# Patient Record
Sex: MALE | Race: BLACK OR AFRICAN AMERICAN | HISPANIC OR LATINO | URBAN - METROPOLITAN AREA
[De-identification: names, ages, dates, MRNs, and addresses within clinical notes are randomized per-mention and may not be internally consistent; named-entity substitution may affect disease eponyms.]

---

## 2018-09-20 VITALS
RESPIRATION RATE: 18 BRPM | SYSTOLIC BLOOD PRESSURE: 145 MMHG | DIASTOLIC BLOOD PRESSURE: 84 MMHG | HEART RATE: 55 BPM | TEMPERATURE: 97 F

## 2018-09-20 RX ORDER — CHLORHEXIDINE GLUCONATE 213 G/1000ML
1 SOLUTION TOPICAL ONCE
Qty: 0 | Refills: 0 | Status: DISCONTINUED | OUTPATIENT
Start: 2018-09-21 | End: 2018-09-22

## 2018-09-20 NOTE — H&P ADULT - HISTORY OF PRESENT ILLNESS
**WILL BRING IN ALL MEDICATIONS--PLEASE UPDATE**    53 yr old M with FHx of heart disease, PMHx of HTN, hyperlipidemia, CVA in 2008 (residual dysarthria, RLE weakness), BPH who initially presented to cardiologist c/o progressively worsening HIGGINS x few months. Patient states he cannot walk >2 blocks at slow pace without becoming fatigued and winded. Patient denies any chest pain, palpitations, dizziness, PND, orthopnea or LE edema. Echo 6/18/18 revealed normal LV systolic function, EF:60-65%. Mild MR, trace TR, trace AR.  Exercise Stress Test 6/25/18 showed 1 mm ST depressions in lead III and AVF. Patient further underwent Nuclear Stress Test on 8/14/18 which revealed medium inferior partially reversible defect, EF:53%.     In light of patients risk factors, CCS Angina Class III equivalent symptoms and abnormal stress test, patient now presents for recommended cardiac catheterization with possible intervention. 53 yr old M with FHx of heart disease, PMHx of HTN, hyperlipidemia, CVA in 2008 (residual dysarthria, RUE and RLE weakness, on plavix), BPH who initially presented to cardiologist c/o progressively worsening HIGGINS x few months. Patient states he cannot walk >2 blocks at slow pace without becoming fatigued and winded. Patient denies any chest pain, palpitations, dizziness, PND, orthopnea or LE edema. Echo 6/18/18 revealed normal LV systolic function, EF:60-65%. Mild MR, trace TR, trace MO.  Exercise Stress Test 6/25/18 showed 1 mm ST depressions in lead III and AVF. Patient further underwent Nuclear Stress Test on 8/14/18 which revealed medium inferior partially reversible defect, EF:53%.     In light of patients risk factors, CCS Angina Class III equivalent symptoms and abnormal stress test, patient now presents for recommended cardiac catheterization with possible intervention.

## 2018-09-20 NOTE — H&P ADULT - ASSESSMENT
53 yr old M with FHx of heart disease, PMHx of HTN, hyperlipidemia, CVA in 2008 (residual dysarthria, RUE and RLE weakness, on plavix), BPH who initially presented to cardiologist c/o CCS Angina Class III equivalent symptoms and abnormal stress test, patient now presents for recommended cardiac catheterization with possible intervention.    - Patient took his home dose of plavix 75mg and ASA 81mg prior to arrival.  Loaded with ASA 243mg PO x1    Risks & benefits of procedure and alternative therapy have been explained to the patient including but not limited to: allergic reaction, bleeding w/possible need for blood transfusion, infection, renal and vascular compromise, limb damage, arrhythmia, stroke, vessel dissection/perforation, Myocardial infarction, emergent CABG. Informed consent obtained and in chart. 53 yr old M with FHx of heart disease, PMHx of HTN, hyperlipidemia, CVA in 2008 (residual dysarthria, RUE and RLE weakness, on plavix), BPH who initially presented to cardiologist c/o CCS Angina Class III equivalent symptoms and abnormal stress test, patient now presents for recommended cardiac catheterization with possible intervention.    - Patient took his home dose of plavix 75mg and ASA 81mg prior to arrival.  Loaded with ASA 243mg PO x1  - potassium 3.5, supplemented with Kdur 40meq PO x1    Risks & benefits of procedure and alternative therapy have been explained to the patient including but not limited to: allergic reaction, bleeding w/possible need for blood transfusion, infection, renal and vascular compromise, limb damage, arrhythmia, stroke, vessel dissection/perforation, Myocardial infarction, emergent CABG. Informed consent obtained and in chart.

## 2018-09-20 NOTE — H&P ADULT - PMH
BPH (benign prostatic hyperplasia)    CVA (cerebral vascular accident)    HTN (hypertension)    Hyperlipidemia

## 2018-09-21 ENCOUNTER — INPATIENT (INPATIENT)
Facility: HOSPITAL | Age: 53
LOS: 0 days | Discharge: HOME | DRG: 247 | End: 2018-09-22
Attending: INTERNAL MEDICINE | Admitting: INTERNAL MEDICINE
Payer: MEDICARE

## 2018-09-21 LAB
ANION GAP SERPL CALC-SCNC: 7 MMOL/L — SIGNIFICANT CHANGE UP (ref 5–17)
APTT BLD: 30.6 SEC — SIGNIFICANT CHANGE UP (ref 27.5–37.4)
BASOPHILS NFR BLD AUTO: 0.9 % — SIGNIFICANT CHANGE UP (ref 0–2)
BUN SERPL-MCNC: 15 MG/DL — SIGNIFICANT CHANGE UP (ref 7–23)
CALCIUM SERPL-MCNC: 10.2 MG/DL — SIGNIFICANT CHANGE UP (ref 8.4–10.5)
CHLORIDE SERPL-SCNC: 100 MMOL/L — SIGNIFICANT CHANGE UP (ref 96–108)
CHOLEST SERPL-MCNC: 233 MG/DL — HIGH (ref 10–199)
CO2 SERPL-SCNC: 35 MMOL/L — HIGH (ref 22–31)
CREAT SERPL-MCNC: 1.25 MG/DL — SIGNIFICANT CHANGE UP (ref 0.5–1.3)
CRP SERPL-MCNC: <0.03 MG/DL — SIGNIFICANT CHANGE UP (ref 0–0.4)
EOSINOPHIL NFR BLD AUTO: 1.5 % — SIGNIFICANT CHANGE UP (ref 0–6)
GLUCOSE SERPL-MCNC: 74 MG/DL — SIGNIFICANT CHANGE UP (ref 70–99)
HBA1C BLD-MCNC: 5.1 % — SIGNIFICANT CHANGE UP (ref 4–5.6)
HCT VFR BLD CALC: 43.7 % — SIGNIFICANT CHANGE UP (ref 39–50)
HDLC SERPL-MCNC: 87 MG/DL — SIGNIFICANT CHANGE UP
HGB BLD-MCNC: 15.1 G/DL — SIGNIFICANT CHANGE UP (ref 13–17)
INR BLD: 0.99 — SIGNIFICANT CHANGE UP (ref 0.88–1.16)
LIPID PNL WITH DIRECT LDL SERPL: 117 MG/DL — SIGNIFICANT CHANGE UP
LYMPHOCYTES # BLD AUTO: 34.1 % — SIGNIFICANT CHANGE UP (ref 13–44)
MCHC RBC-ENTMCNC: 32.2 PG — SIGNIFICANT CHANGE UP (ref 27–34)
MCHC RBC-ENTMCNC: 34.6 G/DL — SIGNIFICANT CHANGE UP (ref 32–36)
MCV RBC AUTO: 93.2 FL — SIGNIFICANT CHANGE UP (ref 80–100)
MONOCYTES NFR BLD AUTO: 9.6 % — SIGNIFICANT CHANGE UP (ref 2–14)
NEUTROPHILS NFR BLD AUTO: 53.9 % — SIGNIFICANT CHANGE UP (ref 43–77)
PLATELET # BLD AUTO: 159 K/UL — SIGNIFICANT CHANGE UP (ref 150–400)
POTASSIUM SERPL-MCNC: 3.5 MMOL/L — SIGNIFICANT CHANGE UP (ref 3.5–5.3)
POTASSIUM SERPL-SCNC: 3.5 MMOL/L — SIGNIFICANT CHANGE UP (ref 3.5–5.3)
PROTHROM AB SERPL-ACNC: 11 SEC — SIGNIFICANT CHANGE UP (ref 9.8–12.7)
RBC # BLD: 4.69 M/UL — SIGNIFICANT CHANGE UP (ref 4.2–5.8)
RBC # FLD: 12.5 % — SIGNIFICANT CHANGE UP (ref 10.3–16.9)
SODIUM SERPL-SCNC: 142 MMOL/L — SIGNIFICANT CHANGE UP (ref 135–145)
TOTAL CHOLESTEROL/HDL RATIO MEASUREMENT: 2.7 RATIO — LOW (ref 3.4–9.6)
TRIGL SERPL-MCNC: 144 MG/DL — SIGNIFICANT CHANGE UP (ref 10–149)
WBC # BLD: 3.2 K/UL — LOW (ref 3.8–10.5)
WBC # FLD AUTO: 3.2 K/UL — LOW (ref 3.8–10.5)

## 2018-09-21 PROCEDURE — 93458 L HRT ARTERY/VENTRICLE ANGIO: CPT | Mod: 26,XU

## 2018-09-21 PROCEDURE — 93010 ELECTROCARDIOGRAM REPORT: CPT

## 2018-09-21 PROCEDURE — 99222 1ST HOSP IP/OBS MODERATE 55: CPT | Mod: 25

## 2018-09-21 PROCEDURE — 92928 PRQ TCAT PLMT NTRAC ST 1 LES: CPT | Mod: LC

## 2018-09-21 RX ORDER — CLOPIDOGREL BISULFATE 75 MG/1
75 TABLET, FILM COATED ORAL DAILY
Qty: 0 | Refills: 0 | Status: DISCONTINUED | OUTPATIENT
Start: 2018-09-22 | End: 2018-09-22

## 2018-09-21 RX ORDER — CARVEDILOL PHOSPHATE 80 MG/1
12.5 CAPSULE, EXTENDED RELEASE ORAL EVERY 12 HOURS
Qty: 0 | Refills: 0 | Status: DISCONTINUED | OUTPATIENT
Start: 2018-09-21 | End: 2018-09-22

## 2018-09-21 RX ORDER — TAMSULOSIN HYDROCHLORIDE 0.4 MG/1
0.4 CAPSULE ORAL AT BEDTIME
Qty: 0 | Refills: 0 | Status: DISCONTINUED | OUTPATIENT
Start: 2018-09-21 | End: 2018-09-22

## 2018-09-21 RX ORDER — ASPIRIN/CALCIUM CARB/MAGNESIUM 324 MG
81 TABLET ORAL DAILY
Qty: 0 | Refills: 0 | Status: DISCONTINUED | OUTPATIENT
Start: 2018-09-22 | End: 2018-09-22

## 2018-09-21 RX ORDER — HYDROCHLOROTHIAZIDE 25 MG
50 TABLET ORAL DAILY
Qty: 0 | Refills: 0 | Status: DISCONTINUED | OUTPATIENT
Start: 2018-09-22 | End: 2018-09-22

## 2018-09-21 RX ORDER — SODIUM CHLORIDE 9 MG/ML
500 INJECTION INTRAMUSCULAR; INTRAVENOUS; SUBCUTANEOUS
Qty: 0 | Refills: 0 | Status: DISCONTINUED | OUTPATIENT
Start: 2018-09-21 | End: 2018-09-21

## 2018-09-21 RX ORDER — ASPIRIN/CALCIUM CARB/MAGNESIUM 324 MG
243 TABLET ORAL ONCE
Qty: 0 | Refills: 0 | Status: COMPLETED | OUTPATIENT
Start: 2018-09-21 | End: 2018-09-21

## 2018-09-21 RX ORDER — POTASSIUM CHLORIDE 20 MEQ
40 PACKET (EA) ORAL ONCE
Qty: 0 | Refills: 0 | Status: COMPLETED | OUTPATIENT
Start: 2018-09-21 | End: 2018-09-21

## 2018-09-21 RX ORDER — AMLODIPINE BESYLATE 2.5 MG/1
5 TABLET ORAL DAILY
Qty: 0 | Refills: 0 | Status: DISCONTINUED | OUTPATIENT
Start: 2018-09-21 | End: 2018-09-22

## 2018-09-21 RX ORDER — ATORVASTATIN CALCIUM 80 MG/1
80 TABLET, FILM COATED ORAL AT BEDTIME
Qty: 0 | Refills: 0 | Status: DISCONTINUED | OUTPATIENT
Start: 2018-09-21 | End: 2018-09-22

## 2018-09-21 RX ORDER — SODIUM CHLORIDE 9 MG/ML
500 INJECTION INTRAMUSCULAR; INTRAVENOUS; SUBCUTANEOUS
Qty: 0 | Refills: 0 | Status: DISCONTINUED | OUTPATIENT
Start: 2018-09-21 | End: 2018-09-22

## 2018-09-21 RX ADMIN — SODIUM CHLORIDE 75 MILLILITER(S): 9 INJECTION INTRAMUSCULAR; INTRAVENOUS; SUBCUTANEOUS at 15:01

## 2018-09-21 RX ADMIN — Medication 40 MILLIEQUIVALENT(S): at 15:00

## 2018-09-21 RX ADMIN — Medication 243 MILLIGRAM(S): at 15:00

## 2018-09-21 NOTE — PROVIDER CONTACT NOTE (OTHER) - ACTION/TREATMENT ORDERED:
SAMSON Freeman came to assess pt by bedside. Pressure applied and 10 mL of Epi-lido, dsg  and linens changed. Pt is to stay flat over night.dgs is CDI after intervention and report is given to night RN.

## 2018-09-21 NOTE — PROVIDER CONTACT NOTE (OTHER) - ASSESSMENT
Pt is s/p pci, R groin is perclose. On arrival dsg CDI, pt walked from stretcher to bed and to the bathroom for the first time. At 1950 noted saturated dsg with sanguinos drainage. Pt denies any cardaic c/o. VSS.

## 2018-09-21 NOTE — PROVIDER CONTACT NOTE (OTHER) - SITUATION
Pt is s/p pci, R groin is perclose. On arrival dsg CDI, pt walked from stretcher to bed and to the bathroom for the first time. At 1950 noted saturated dsg with sanguinos drainage. Pt denies any c/o.

## 2018-09-21 NOTE — PROVIDER CONTACT NOTE (OTHER) - BACKGROUND
Pt is s/p pci, R groin is perclose. On arrival dsg CDI, pt walked from stretcher to bed and to the bathroom for the first time. At 1950 noted saturated dsg with sanguinos drainage. Pt denies any c/o

## 2018-09-22 VITALS
RESPIRATION RATE: 16 BRPM | SYSTOLIC BLOOD PRESSURE: 128 MMHG | DIASTOLIC BLOOD PRESSURE: 84 MMHG | HEART RATE: 71 BPM | OXYGEN SATURATION: 98 %

## 2018-09-22 LAB
ANION GAP SERPL CALC-SCNC: 8 MMOL/L — SIGNIFICANT CHANGE UP (ref 5–17)
BASOPHILS NFR BLD AUTO: 0 % — SIGNIFICANT CHANGE UP (ref 0–2)
BUN SERPL-MCNC: 17 MG/DL — SIGNIFICANT CHANGE UP (ref 7–23)
CALCIUM SERPL-MCNC: 10 MG/DL — SIGNIFICANT CHANGE UP (ref 8.4–10.5)
CHLORIDE SERPL-SCNC: 101 MMOL/L — SIGNIFICANT CHANGE UP (ref 96–108)
CO2 SERPL-SCNC: 32 MMOL/L — HIGH (ref 22–31)
CREAT SERPL-MCNC: 1.1 MG/DL — SIGNIFICANT CHANGE UP (ref 0.5–1.3)
EOSINOPHIL NFR BLD AUTO: 1 % — SIGNIFICANT CHANGE UP (ref 0–6)
GLUCOSE SERPL-MCNC: 93 MG/DL — SIGNIFICANT CHANGE UP (ref 70–99)
HCT VFR BLD CALC: 40.7 % — SIGNIFICANT CHANGE UP (ref 39–50)
HCT VFR BLD CALC: 42.4 % — SIGNIFICANT CHANGE UP (ref 39–50)
HGB BLD-MCNC: 14 G/DL — SIGNIFICANT CHANGE UP (ref 13–17)
HGB BLD-MCNC: 15 G/DL — SIGNIFICANT CHANGE UP (ref 13–17)
LYMPHOCYTES # BLD AUTO: 20.7 % — SIGNIFICANT CHANGE UP (ref 13–44)
MAGNESIUM SERPL-MCNC: 1.9 MG/DL — SIGNIFICANT CHANGE UP (ref 1.6–2.6)
MCHC RBC-ENTMCNC: 32.1 PG — SIGNIFICANT CHANGE UP (ref 27–34)
MCHC RBC-ENTMCNC: 33.2 PG — SIGNIFICANT CHANGE UP (ref 27–34)
MCHC RBC-ENTMCNC: 34.4 G/DL — SIGNIFICANT CHANGE UP (ref 32–36)
MCHC RBC-ENTMCNC: 35.4 G/DL — SIGNIFICANT CHANGE UP (ref 32–36)
MCV RBC AUTO: 93.3 FL — SIGNIFICANT CHANGE UP (ref 80–100)
MCV RBC AUTO: 93.8 FL — SIGNIFICANT CHANGE UP (ref 80–100)
MONOCYTES NFR BLD AUTO: 9.9 % — SIGNIFICANT CHANGE UP (ref 2–14)
NEUTROPHILS NFR BLD AUTO: 68.4 % — SIGNIFICANT CHANGE UP (ref 43–77)
PLATELET # BLD AUTO: 141 K/UL — LOW (ref 150–400)
PLATELET # BLD AUTO: 155 K/UL — SIGNIFICANT CHANGE UP (ref 150–400)
POTASSIUM SERPL-MCNC: 3.4 MMOL/L — LOW (ref 3.5–5.3)
POTASSIUM SERPL-SCNC: 3.4 MMOL/L — LOW (ref 3.5–5.3)
RBC # BLD: 4.36 M/UL — SIGNIFICANT CHANGE UP (ref 4.2–5.8)
RBC # BLD: 4.52 M/UL — SIGNIFICANT CHANGE UP (ref 4.2–5.8)
RBC # FLD: 12.5 % — SIGNIFICANT CHANGE UP (ref 10.3–16.9)
RBC # FLD: 12.6 % — SIGNIFICANT CHANGE UP (ref 10.3–16.9)
SODIUM SERPL-SCNC: 141 MMOL/L — SIGNIFICANT CHANGE UP (ref 135–145)
WBC # BLD: 4.8 K/UL — SIGNIFICANT CHANGE UP (ref 3.8–10.5)
WBC # BLD: 5 K/UL — SIGNIFICANT CHANGE UP (ref 3.8–10.5)
WBC # FLD AUTO: 4.8 K/UL — SIGNIFICANT CHANGE UP (ref 3.8–10.5)
WBC # FLD AUTO: 5 K/UL — SIGNIFICANT CHANGE UP (ref 3.8–10.5)

## 2018-09-22 PROCEDURE — 99238 HOSP IP/OBS DSCHRG MGMT 30/<: CPT

## 2018-09-22 RX ORDER — ASPIRIN/CALCIUM CARB/MAGNESIUM 324 MG
1 TABLET ORAL
Qty: 30 | Refills: 11 | OUTPATIENT
Start: 2018-09-22 | End: 2019-09-16

## 2018-09-22 RX ORDER — CLOPIDOGREL BISULFATE 75 MG/1
1 TABLET, FILM COATED ORAL
Qty: 0 | Refills: 0 | COMMUNITY

## 2018-09-22 RX ORDER — MAGNESIUM OXIDE 400 MG ORAL TABLET 241.3 MG
800 TABLET ORAL ONCE
Qty: 0 | Refills: 0 | Status: COMPLETED | OUTPATIENT
Start: 2018-09-22 | End: 2018-09-22

## 2018-09-22 RX ORDER — POTASSIUM CHLORIDE 20 MEQ
40 PACKET (EA) ORAL EVERY 4 HOURS
Qty: 0 | Refills: 0 | Status: COMPLETED | OUTPATIENT
Start: 2018-09-22 | End: 2018-09-22

## 2018-09-22 RX ORDER — SIMVASTATIN 20 MG/1
1 TABLET, FILM COATED ORAL
Qty: 0 | Refills: 0 | COMMUNITY

## 2018-09-22 RX ORDER — ATORVASTATIN CALCIUM 80 MG/1
1 TABLET, FILM COATED ORAL
Qty: 30 | Refills: 2 | OUTPATIENT
Start: 2018-09-22 | End: 2018-12-20

## 2018-09-22 RX ORDER — CLOPIDOGREL BISULFATE 75 MG/1
1 TABLET, FILM COATED ORAL
Qty: 30 | Refills: 11 | OUTPATIENT
Start: 2018-09-22 | End: 2019-09-16

## 2018-09-22 RX ORDER — ASPIRIN/CALCIUM CARB/MAGNESIUM 324 MG
1 TABLET ORAL
Qty: 0 | Refills: 0 | COMMUNITY

## 2018-09-22 RX ADMIN — MAGNESIUM OXIDE 400 MG ORAL TABLET 800 MILLIGRAM(S): 241.3 TABLET ORAL at 10:33

## 2018-09-22 RX ADMIN — Medication 20 MILLIGRAM(S): at 06:59

## 2018-09-22 RX ADMIN — TAMSULOSIN HYDROCHLORIDE 0.4 MILLIGRAM(S): 0.4 CAPSULE ORAL at 00:18

## 2018-09-22 RX ADMIN — Medication 50 MILLIGRAM(S): at 06:59

## 2018-09-22 RX ADMIN — CLOPIDOGREL BISULFATE 75 MILLIGRAM(S): 75 TABLET, FILM COATED ORAL at 10:34

## 2018-09-22 RX ADMIN — AMLODIPINE BESYLATE 5 MILLIGRAM(S): 2.5 TABLET ORAL at 00:18

## 2018-09-22 RX ADMIN — Medication 40 MILLIEQUIVALENT(S): at 13:04

## 2018-09-22 RX ADMIN — Medication 81 MILLIGRAM(S): at 10:34

## 2018-09-22 RX ADMIN — Medication 40 MILLIEQUIVALENT(S): at 10:33

## 2018-09-22 NOTE — DISCHARGE NOTE ADULT - PROVIDER TOKENS
FREE:[LAST:[[place]],FIRST:[[place]],PHONE:[phone]],FAX:[phone]   [phone]    -],ADDRESS:[[address] [place] [address]]]

## 2018-09-22 NOTE — DISCHARGE NOTE ADULT - PLAN OF CARE
Maintain Low Fat, Low Cholesterol and Low Salt Diet You had a cardiac angiogram with stent placement to one of your heart arteries (Obtuse Marginal). You will need to return in 6 weeks to have another stent placed. Continue Aspirin and Plavix. DO NOT STOP TAKING THESE MEDICATIONS UNLESS INSTRUCTED BY YOUR CARDIOLOGIST AS YOUR STENT CAN CLOSE RESULTING IN HEART ATTACK. STOP SIMVASTATIN. TAKE LIPITOR 80 MG BY MOUTH ONCE DAILY AT BEDTIME. HAVE LIVER FUNCTION CHECKED IN 1 MONTH. Monitor BP. Maintain Low Salt Diet (Less than 2 g -2000 mg) daily. Continue Enalapril, HCTZ, Coreg and Norvasc. STOP SIMVASTATIN. TAKE LIPITOR 80 MG BY MOUTH ONCE DAILY AT BEDTIME. HAVE LIVER FUNCTION CHECKED IN 1 MONTH.

## 2018-09-22 NOTE — DISCHARGE NOTE ADULT - INSTRUCTIONS
You underwent a coronary angiogram and should wait 3 days before returning to ordinary activities. Do not drive for 2 days. Consult your doctor before returning to vigorous activity. You may return to work in 3-5 days. The catheter from your right groin was removed. You may shower once the dressing is removed, but avoid baths, hot tubs, or swimming for 5 days to prevent infection. If you notice bleeding from the site, hardening and pain at the site, drainage or redness from the site, coolness/paleness of the right leg, worsening weakness or right leg or paralysis of right leg, swelling, or fever, please call [phone]. Please continue your Aspirin and Plavix  as prescribed unless otherwise indicated by your cardiologist. All of your prescriptions have been sent to the pharmacy. Please follow up with Dr. Wren in 1-2 weeks. All of your needed prescriptions have been sent electronically to your pharmacy.

## 2018-09-22 NOTE — DISCHARGE NOTE ADULT - MEDICATION SUMMARY - MEDICATIONS TO TAKE
I will START or STAY ON the medications listed below when I get home from the hospital:    Aspirin Enteric Coated 81 mg oral delayed release tablet  -- 1 tab(s) by mouth once a day  -- Indication: For Coronary Artery Disease (Heart) , STENT    enalapril 20 mg oral tablet  -- 1 tab(s) by mouth once a day  -- Indication: For Hypertension (Blood Pressure),     Flomax 0.4 mg oral capsule  -- 1 cap(s) by mouth once a day  -- Indication: For Prostate    atorvastatin 80 mg oral tablet  -- 1 tab(s) by mouth once a day (at bedtime)  -- Indication: For Hyperlipidemia (Cholesterol) , Coronary Artery Disease (Heart)     Plavix 75 mg oral tablet  -- 1 tab(s) by mouth once a day  -- Indication: For Coronary Artery Disease (Heart) , STENT, History of Stroke    Coreg 12.5 mg oral tablet  -- 1 tab(s) by mouth 2 times a day  -- Indication: For Hypertension (Blood Pressure),     Norvasc 5 mg oral tablet  -- 1 tab(s) by mouth once a day  -- Indication: For Hypertension (Blood Pressure),     hydroCHLOROthiazide 50 mg oral tablet  -- 1 tab(s) by mouth once a day  -- Indication: For Hypertension (Blood Pressure),     Vitamin D3 50,000 intl units oral capsule  -- 1 cap(s) by mouth once a week  -- Indication: For Supplement

## 2018-09-22 NOTE — DISCHARGE NOTE ADULT - HOSPITAL COURSE
53 yr old M with FHx of heart disease, PMHx of HTN, hyperlipidemia, CVA in 2008 (residual dysarthria, RUE and RLE weakness, on plavix), BPH who initially presented to cardiologist c/o progressively worsening HIGGINS x few months. Patient states he cannot walk >2 blocks at slow pace without becoming fatigued and winded. Patient denies any chest pain, palpitations, dizziness, PND, orthopnea or LE edema. Echo 6/18/18 revealed normal LV systolic function, EF:60-65%. Mild MR, trace TR, trace MT.  Exercise Stress Test 6/25/18 showed 1 mm ST depressions in lead III and AVF. Patient further underwent Nuclear Stress Test on 8/14/18 which revealed medium inferior partially reversible defect, EF:53%. In light of patients risk factors, CCS Angina Class III equivalent symptoms and abnormal stress test, patient presented for recommended cardiac catheterization with possible intervention. Patient s/p cardiac cath 9/21/18 s/p JAVIER to OM1 (99%), mRCA instent  with R-R collaterals, dLCx instent  with R-L collaterals EF 60-65% by ECHO. Plan for pt to come back in 6 weeks for staged PCI of mRCA instent . Patient developed right groin ooze (s/p Perclose) post procedure treated with Epi/Lido. Patient with episode of hematuria overnight stat CBC stable however this morning urine clear; may have been secondary to right groin ooze and blood leaking into urinal while patient urinated. Labs and telemetry reviewed. Hypokalemia K +3.4 Kdur 40 mEq PO x 2 doses given. Hypomagnesemia Magnesium 1.9. Mag Ox 800 mg PO x 1 given. Patient C/P free and HD stable and cleared for discharge by Dr. Patterson. Prescriptions for ASA, Plavix, and Lipitor E-Prescribed to [facility].     V/S:	BP: 130-150’s/70s		HR: 50s	RR: 16		Temp: 98.9 F  Monitor-NSR			O2 sat- 99% RA   	  GENERAL:  Sitting in bed in no acute distress  CVS: + S1 S2. RRR. No murmurs, rubs or gallops.   Pulm: CTA Bilaterally. No rhonchi, wheezes, or rales.  Abd: BS x 4. Soft NT/ND.  Ext: No clubbing, cyanosis, or edema BLE. No calf tenderness BLE.   Right Groin: Soft. Non-tender. No hematoma, bleed or bruit.   Distal Pulses Intact to Baseline  Neuro: A and O x 3. 5/5 strength LUE and LLE. 3-4/5 strength RUE and RLE. Dysarthia (slurred speech). No tongue deviation

## 2018-09-22 NOTE — DISCHARGE NOTE ADULT - CARE PLAN
Principal Discharge DX:	CAD (coronary artery disease)  Goal:	Maintain Low Fat, Low Cholesterol and Low Salt Diet  Assessment and plan of treatment:	You had a cardiac angiogram with stent placement to one of your heart arteries (Obtuse Marginal). You will need to return in 6 weeks to have another stent placed. Continue Aspirin and Plavix. DO NOT STOP TAKING THESE MEDICATIONS UNLESS INSTRUCTED BY YOUR CARDIOLOGIST AS YOUR STENT CAN CLOSE RESULTING IN HEART ATTACK. STOP SIMVASTATIN. TAKE LIPITOR 80 MG BY MOUTH ONCE DAILY AT BEDTIME. HAVE LIVER FUNCTION CHECKED IN 1 MONTH.  Secondary Diagnosis:	HTN (hypertension)  Assessment and plan of treatment:	Monitor BP. Maintain Low Salt Diet (Less than 2 g -2000 mg) daily. Continue Enalapril, HCTZ, Coreg and Norvasc.  Secondary Diagnosis:	Hyperlipidemia  Assessment and plan of treatment:	STOP SIMVASTATIN. TAKE LIPITOR 80 MG BY MOUTH ONCE DAILY AT BEDTIME. HAVE LIVER FUNCTION CHECKED IN 1 MONTH.

## 2018-09-22 NOTE — DISCHARGE NOTE ADULT - PATIENT PORTAL LINK FT
You can access the [facility] Patient Portal, offered by [facility], by registering with the following website: [email or web address]

## 2018-09-26 DIAGNOSIS — E83.42 HYPOMAGNESEMIA: ICD-10-CM
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
## 2018-09-26 DIAGNOSIS — I25.118 ATHEROSCLEROTIC HEART DISEASE OF NATIVE CORONARY ARTERY WITH OTHER FORMS OF ANGINA PECTORIS: ICD-10-CM

## 2018-09-26 DIAGNOSIS — I69.951 HEMIPLEGIA AND HEMIPARESIS FOLLOWING UNSPECIFIED CEREBROVASCULAR DISEASE AFFECTING RIGHT DOMINANT SIDE: ICD-10-CM

## 2018-09-26 DIAGNOSIS — Z82.49 FAMILY HISTORY OF ISCHEMIC HEART DISEASE AND OTHER DISEASES OF THE CIRCULATORY SYSTEM: ICD-10-CM

## 2018-09-26 DIAGNOSIS — R31.9 HEMATURIA, UNSPECIFIED: ICD-10-CM

## 2018-09-26 DIAGNOSIS — I12.9 HYPERTENSIVE CHRONIC KIDNEY DISEASE WITH STAGE 1 THROUGH STAGE 4 CHRONIC KIDNEY DISEASE, OR UNSPECIFIED CHRONIC KIDNEY DISEASE: ICD-10-CM

## 2018-09-26 DIAGNOSIS — E78.5 HYPERLIPIDEMIA, UNSPECIFIED: ICD-10-CM

## 2018-09-26 DIAGNOSIS — N40.0 BENIGN PROSTATIC HYPERPLASIA WITHOUT LOWER URINARY TRACT SYMPTOMS: ICD-10-CM

## 2018-09-26 DIAGNOSIS — Z79.02 LONG TERM (CURRENT) USE OF ANTITHROMBOTICS/ANTIPLATELETS: ICD-10-CM

## 2018-09-26 DIAGNOSIS — I69.922 DYSARTHRIA FOLLOWING UNSPECIFIED CEREBROVASCULAR DISEASE: ICD-10-CM

## 2018-09-26 DIAGNOSIS — N18.9 CHRONIC KIDNEY DISEASE, UNSPECIFIED: ICD-10-CM

## 2018-09-26 DIAGNOSIS — E87.6 HYPOKALEMIA: ICD-10-CM

## 2018-09-26 DIAGNOSIS — Z79.82 LONG TERM (CURRENT) USE OF ASPIRIN: ICD-10-CM

## 2018-09-26 DIAGNOSIS — T82.855A STENOSIS OF CORONARY ARTERY STENT, INITIAL ENCOUNTER: ICD-10-CM

## 2018-09-26 DIAGNOSIS — Z95.5 PRESENCE OF CORONARY ANGIOPLASTY IMPLANT AND GRAFT: ICD-10-CM

## 2018-10-01 PROCEDURE — C1769: CPT

## 2018-10-01 PROCEDURE — 82553 CREATINE MB FRACTION: CPT

## 2018-10-01 PROCEDURE — 85027 COMPLETE CBC AUTOMATED: CPT

## 2018-10-01 PROCEDURE — C1889: CPT

## 2018-10-01 PROCEDURE — C1887: CPT

## 2018-10-01 PROCEDURE — 80048 BASIC METABOLIC PNL TOTAL CA: CPT

## 2018-10-01 PROCEDURE — C1894: CPT

## 2018-10-01 PROCEDURE — 85610 PROTHROMBIN TIME: CPT

## 2018-10-01 PROCEDURE — 86140 C-REACTIVE PROTEIN: CPT

## 2018-10-01 PROCEDURE — 85730 THROMBOPLASTIN TIME PARTIAL: CPT

## 2018-10-01 PROCEDURE — 82550 ASSAY OF CK (CPK): CPT

## 2018-10-01 PROCEDURE — C1725: CPT

## 2018-10-01 PROCEDURE — 93005 ELECTROCARDIOGRAM TRACING: CPT

## 2018-10-01 PROCEDURE — 83735 ASSAY OF MAGNESIUM: CPT

## 2018-10-01 PROCEDURE — 83036 HEMOGLOBIN GLYCOSYLATED A1C: CPT

## 2018-10-01 PROCEDURE — 36415 COLL VENOUS BLD VENIPUNCTURE: CPT

## 2018-10-01 PROCEDURE — 80061 LIPID PANEL: CPT

## 2018-10-01 PROCEDURE — C1760: CPT

## 2018-10-01 PROCEDURE — 85025 COMPLETE CBC W/AUTO DIFF WBC: CPT

## 2018-10-11 PROBLEM — N40.0 BENIGN PROSTATIC HYPERPLASIA WITHOUT LOWER URINARY TRACT SYMPTOMS: Chronic | Status: ACTIVE | Noted: 2018-09-20

## 2018-10-11 PROBLEM — I10 ESSENTIAL (PRIMARY) HYPERTENSION: Chronic | Status: ACTIVE | Noted: 2018-09-20

## 2018-10-11 PROBLEM — I63.9 CEREBRAL INFARCTION, UNSPECIFIED: Chronic | Status: ACTIVE | Noted: 2018-09-20

## 2018-10-11 PROBLEM — E78.5 HYPERLIPIDEMIA, UNSPECIFIED: Chronic | Status: ACTIVE | Noted: 2018-09-20

## 2018-10-25 VITALS
DIASTOLIC BLOOD PRESSURE: 77 MMHG | HEART RATE: 53 BPM | HEIGHT: 66 IN | TEMPERATURE: 97 F | WEIGHT: 149.91 LBS | SYSTOLIC BLOOD PRESSURE: 124 MMHG

## 2018-10-25 NOTE — H&P ADULT - ASSESSMENT
53 y.o Male with FHx of heart disease, PMHx of HTN, hyperlipidemia, CVA in 2008 (residual dysarthria, RUE and RLE weakness, on Plavix), BPH, Known CAD s/p JAVIER OM2 @ [facility] on 09/21/18, with residual 40% ,LAD, 100% dLCx ISR fills via collaterals from right, 100% mRCA ISR-distal segment fills via right to right, left to right collaterals.   Pt now returns for stage PCI of known residual RCA lesion.      Risks & benefits of procedure and alternative therapy have been explained to the patient including but not limited to: allergic reaction, bleeding w/possible need for blood transfusion, infection, renal and vascular compromise, limb damage, arrhythmia, stroke, vessel dissection/perforation, Myocardial infarction, emergent CABG. Informed consent obtained and in chart.     -pt compliant with aspirin 81 mg and plavix 75 mg daily and took home doses today. No additional given pre-cath  -Pt with normal EF, euvolemic on exam. Pre cath hydration with NS @ 75 cc/hr   -K + 3.1 today, repleated Kdur 60 mEq x 1 followed by Kdur 30 meq x 1

## 2018-10-25 NOTE — H&P ADULT - NEUROLOGICAL DETAILS
RUE 3/5, RLE 3/5/alert and oriented x 3/responds to pain/responds to verbal commands/cranial nerves intact/strength decreased/sensation intact

## 2018-10-25 NOTE — H&P ADULT - RS GEN PE MLT RESP DETAILS PC
good air movement/respirations non-labored/clear to auscultation bilaterally/breath sounds equal/airway patent/normal

## 2018-10-25 NOTE — H&P ADULT - HISTORY OF PRESENT ILLNESS
***SKELETON:      53 y.o Male with FHx of heart disease, PMHx of HTN, hyperlipidemia, CVA in 2008 (residual dysarthria, RUE and RLE weakness, on Plavix), BPH, Known CAD s/p JAVIER OM2 @ [facility] on 09/21/18, with residual 40% ,LAD, 100% dLCx ISR fills via collaterals from right, 100% mRCA ISR-distal segment fills via right to right, left to right collaterals.  He returns today for stage PCI of known residual RCA lesion.  Since his procedure.  Pt reports doing well and reports compliance with her DAPT.  Pt initially presented to his cardiologist   progressively worsening HIGGINS x few months. Patient states he cannot walk >2 blocks at slow pace without becoming fatigued and winded. Patient denies any chest pain, palpitations, dizziness, PND, orthopnea or LE edema. Echo 6/18/18 revealed normal LV systolic function, EF:60-65%. Mild MR, trace TR, trace NM.  Exercise Stress Test 6/25/18 showed 1 mm ST depressions in lead III and AVF. Patient further underwent Nuclear Stress Test on 8/14/18 which revealed medium inferior partially reversible defect, EF:53%.     In light of patients risk factors,  Known CAD,  pt now returns for stage PCI of known residual RCA lesion.          CATH HX:  Cardiac Cath @ [facility] on 09/21/18: Normal LM, 40% ,LAD, OM1 w/ minimal luminal irregularities,  99%  OM2, 100% dLCx ISR fills via collaterals from right, 100% mRCA ISR-distal segment fills via right to right, left to right collaterals, LVEF of 65%.  LVEDP of 11 mmHg.  No AS or MR.  Successful JAVIER OM2. 53 y.o Male with FHx of heart disease, PMHx of HTN, hyperlipidemia, CVA in 2008 (residual dysarthria, RUE and RLE weakness, on Plavix), BPH, Known CAD s/p JAVIER OM2 @ [facility] on 09/21/18, with residual 40% ,LAD, 100% dLCx ISR fills via collaterals from right, 100% mRCA ISR-distal segment fills via right to right, left to right collaterals.  He returns today for stage PCI of known residual RCA lesion.  Since his procedure.  Pt reports doing well and reports compliance with his DAPT; ASA/Plavix.    He denies experiencing any CP, SOB, palpitations, dizziness, syncope, recent PND/orthopnea, or LE edema.  Pt initially presented to his cardiologist c/o  progressively worsening HIGGINS x few months. At the time he reported that he cannot walk >2 blocks at slow pace without becoming fatigued and winded. Echo 6/18/18 revealed normal LV systolic function, EF:60-65%. Mild MR, trace TR, trace TN.  Exercise Stress Test 6/25/18 showed 1 mm ST depressions in lead III and AVF.  Nuclear Stress Test on 8/14/18 which revealed medium inferior partially reversible defect, EF:53%.     In light of patients risk factors,  Known CAD,  pt now returns for stage PCI of known residual RCA lesion.          CATH HX:  Cardiac Cath @ [facility] on 09/21/18: Normal LM, 40% ,LAD, OM1 w/ minimal luminal irregularities,  99%  OM2, 100% dLCx ISR fills via collaterals from right, 100% mRCA ISR-distal segment fills via right to right, left to right collaterals, LVEF of 65%.  LVEDP of 11 mmHg.  No AS or MR.  Successful JAVIER OM2.

## 2018-10-25 NOTE — H&P ADULT - PMH
BPH (benign prostatic hyperplasia)    Coronary artery disease    CVA (cerebral vascular accident)    HTN (hypertension)    Hyperlipidemia

## 2018-11-02 ENCOUNTER — INPATIENT (INPATIENT)
Facility: HOSPITAL | Age: 53
LOS: 0 days | Discharge: HOME | DRG: 246 | End: 2018-11-03
Attending: INTERNAL MEDICINE | Admitting: INTERNAL MEDICINE
Payer: MEDICARE

## 2018-11-02 LAB
ALBUMIN SERPL ELPH-MCNC: 4.4 G/DL — SIGNIFICANT CHANGE UP (ref 3.3–5)
ALP SERPL-CCNC: 68 U/L — SIGNIFICANT CHANGE UP (ref 40–120)
ALT FLD-CCNC: 30 U/L — SIGNIFICANT CHANGE UP (ref 10–45)
ANION GAP SERPL CALC-SCNC: 12 MMOL/L — SIGNIFICANT CHANGE UP (ref 5–17)
APTT BLD: 29.7 SEC — SIGNIFICANT CHANGE UP (ref 27.5–36.3)
AST SERPL-CCNC: 27 U/L — SIGNIFICANT CHANGE UP (ref 10–40)
BASOPHILS NFR BLD AUTO: 0.3 % — SIGNIFICANT CHANGE UP (ref 0–2)
BILIRUB SERPL-MCNC: 1.8 MG/DL — HIGH (ref 0.2–1.2)
BUN SERPL-MCNC: 21 MG/DL — SIGNIFICANT CHANGE UP (ref 7–23)
CALCIUM SERPL-MCNC: 10 MG/DL — SIGNIFICANT CHANGE UP (ref 8.4–10.5)
CHLORIDE SERPL-SCNC: 97 MMOL/L — SIGNIFICANT CHANGE UP (ref 96–108)
CHOLEST SERPL-MCNC: 165 MG/DL — SIGNIFICANT CHANGE UP (ref 10–199)
CK MB CFR SERPL CALC: 1.1 NG/ML — SIGNIFICANT CHANGE UP (ref 0–6.7)
CK SERPL-CCNC: 179 U/L — SIGNIFICANT CHANGE UP (ref 30–200)
CO2 SERPL-SCNC: 30 MMOL/L — SIGNIFICANT CHANGE UP (ref 22–31)
CREAT SERPL-MCNC: 1.15 MG/DL — SIGNIFICANT CHANGE UP (ref 0.5–1.3)
CRP SERPL-MCNC: <0.03 MG/DL — SIGNIFICANT CHANGE UP (ref 0–0.4)
EOSINOPHIL NFR BLD AUTO: 2.3 % — SIGNIFICANT CHANGE UP (ref 0–6)
GLUCOSE SERPL-MCNC: 84 MG/DL — SIGNIFICANT CHANGE UP (ref 70–99)
HBA1C BLD-MCNC: 5.4 % — SIGNIFICANT CHANGE UP (ref 4–5.6)
HCT VFR BLD CALC: 44.5 % — SIGNIFICANT CHANGE UP (ref 39–50)
HDLC SERPL-MCNC: 68 MG/DL — SIGNIFICANT CHANGE UP
HGB BLD-MCNC: 15.6 G/DL — SIGNIFICANT CHANGE UP (ref 13–17)
INR BLD: 1.08 — SIGNIFICANT CHANGE UP (ref 0.88–1.16)
LIPID PNL WITH DIRECT LDL SERPL: 81 MG/DL — SIGNIFICANT CHANGE UP
LYMPHOCYTES # BLD AUTO: 40.6 % — SIGNIFICANT CHANGE UP (ref 13–44)
MCHC RBC-ENTMCNC: 32.4 PG — SIGNIFICANT CHANGE UP (ref 27–34)
MCHC RBC-ENTMCNC: 35.1 G/DL — SIGNIFICANT CHANGE UP (ref 32–36)
MCV RBC AUTO: 92.5 FL — SIGNIFICANT CHANGE UP (ref 80–100)
MONOCYTES NFR BLD AUTO: 12.5 % — SIGNIFICANT CHANGE UP (ref 2–14)
NEUTROPHILS NFR BLD AUTO: 44.3 % — SIGNIFICANT CHANGE UP (ref 43–77)
PLATELET # BLD AUTO: 176 K/UL — SIGNIFICANT CHANGE UP (ref 150–400)
POTASSIUM SERPL-MCNC: 3.1 MMOL/L — LOW (ref 3.5–5.3)
POTASSIUM SERPL-SCNC: 3.1 MMOL/L — LOW (ref 3.5–5.3)
PROT SERPL-MCNC: 7.9 G/DL — SIGNIFICANT CHANGE UP (ref 6–8.3)
PROTHROM AB SERPL-ACNC: 12.2 SEC — SIGNIFICANT CHANGE UP (ref 10–12.9)
RBC # BLD: 4.81 M/UL — SIGNIFICANT CHANGE UP (ref 4.2–5.8)
RBC # FLD: 12.3 % — SIGNIFICANT CHANGE UP (ref 10.3–16.9)
SODIUM SERPL-SCNC: 139 MMOL/L — SIGNIFICANT CHANGE UP (ref 135–145)
TOTAL CHOLESTEROL/HDL RATIO MEASUREMENT: 2.4 RATIO — LOW (ref 3.4–9.6)
TRIGL SERPL-MCNC: 81 MG/DL — SIGNIFICANT CHANGE UP (ref 10–149)
WBC # BLD: 3.9 K/UL — SIGNIFICANT CHANGE UP (ref 3.8–10.5)
WBC # FLD AUTO: 3.9 K/UL — SIGNIFICANT CHANGE UP (ref 3.8–10.5)

## 2018-11-02 PROCEDURE — 93454 CORONARY ARTERY ANGIO S&I: CPT | Mod: 26,XU

## 2018-11-02 PROCEDURE — 92928 PRQ TCAT PLMT NTRAC ST 1 LES: CPT | Mod: RC

## 2018-11-02 RX ORDER — AMLODIPINE BESYLATE 2.5 MG/1
5 TABLET ORAL DAILY
Qty: 0 | Refills: 0 | Status: DISCONTINUED | OUTPATIENT
Start: 2018-11-02 | End: 2018-11-03

## 2018-11-02 RX ORDER — SODIUM CHLORIDE 9 MG/ML
500 INJECTION INTRAMUSCULAR; INTRAVENOUS; SUBCUTANEOUS
Qty: 0 | Refills: 0 | Status: DISCONTINUED | OUTPATIENT
Start: 2018-11-02 | End: 2018-11-03

## 2018-11-02 RX ORDER — TAMSULOSIN HYDROCHLORIDE 0.4 MG/1
1 CAPSULE ORAL
Qty: 0 | Refills: 0 | COMMUNITY

## 2018-11-02 RX ORDER — INFLUENZA VIRUS VACCINE 15; 15; 15; 15 UG/.5ML; UG/.5ML; UG/.5ML; UG/.5ML
0.5 SUSPENSION INTRAMUSCULAR ONCE
Qty: 0 | Refills: 0 | Status: DISCONTINUED | OUTPATIENT
Start: 2018-11-02 | End: 2018-11-03

## 2018-11-02 RX ORDER — CHOLECALCIFEROL (VITAMIN D3) 125 MCG
1 CAPSULE ORAL
Qty: 0 | Refills: 0 | COMMUNITY

## 2018-11-02 RX ORDER — ATORVASTATIN CALCIUM 80 MG/1
80 TABLET, FILM COATED ORAL AT BEDTIME
Qty: 0 | Refills: 0 | Status: DISCONTINUED | OUTPATIENT
Start: 2018-11-02 | End: 2018-11-03

## 2018-11-02 RX ORDER — POLYETHYLENE GLYCOL 3350 17 G/17G
17 POWDER, FOR SOLUTION ORAL
Qty: 0 | Refills: 0 | COMMUNITY

## 2018-11-02 RX ORDER — SODIUM CHLORIDE 9 MG/ML
500 INJECTION INTRAMUSCULAR; INTRAVENOUS; SUBCUTANEOUS
Qty: 0 | Refills: 0 | Status: DISCONTINUED | OUTPATIENT
Start: 2018-11-02 | End: 2018-11-02

## 2018-11-02 RX ORDER — POTASSIUM CHLORIDE 20 MEQ
60 PACKET (EA) ORAL ONCE
Qty: 0 | Refills: 0 | Status: COMPLETED | OUTPATIENT
Start: 2018-11-02 | End: 2018-11-02

## 2018-11-02 RX ORDER — POTASSIUM CHLORIDE 20 MEQ
30 PACKET (EA) ORAL ONCE
Qty: 0 | Refills: 0 | Status: COMPLETED | OUTPATIENT
Start: 2018-11-02 | End: 2018-11-03

## 2018-11-02 RX ORDER — ASPIRIN/CALCIUM CARB/MAGNESIUM 324 MG
81 TABLET ORAL DAILY
Qty: 0 | Refills: 0 | Status: DISCONTINUED | OUTPATIENT
Start: 2018-11-03 | End: 2018-11-03

## 2018-11-02 RX ORDER — CLOPIDOGREL BISULFATE 75 MG/1
75 TABLET, FILM COATED ORAL DAILY
Qty: 0 | Refills: 0 | Status: DISCONTINUED | OUTPATIENT
Start: 2018-11-03 | End: 2018-11-03

## 2018-11-02 RX ORDER — TAMSULOSIN HYDROCHLORIDE 0.4 MG/1
0.4 CAPSULE ORAL AT BEDTIME
Qty: 0 | Refills: 0 | Status: DISCONTINUED | OUTPATIENT
Start: 2018-11-02 | End: 2018-11-03

## 2018-11-02 RX ORDER — HYDROCHLOROTHIAZIDE 25 MG
50 TABLET ORAL DAILY
Qty: 0 | Refills: 0 | Status: DISCONTINUED | OUTPATIENT
Start: 2018-11-03 | End: 2018-11-03

## 2018-11-02 RX ORDER — CARVEDILOL PHOSPHATE 80 MG/1
12.5 CAPSULE, EXTENDED RELEASE ORAL EVERY 12 HOURS
Qty: 0 | Refills: 0 | Status: DISCONTINUED | OUTPATIENT
Start: 2018-11-02 | End: 2018-11-03

## 2018-11-02 RX ADMIN — Medication 60 MILLIEQUIVALENT(S): at 17:17

## 2018-11-03 VITALS — TEMPERATURE: 98 F

## 2018-11-03 LAB
ANION GAP SERPL CALC-SCNC: 13 MMOL/L — SIGNIFICANT CHANGE UP (ref 5–17)
BUN SERPL-MCNC: 16 MG/DL — SIGNIFICANT CHANGE UP (ref 7–23)
CALCIUM SERPL-MCNC: 9.6 MG/DL — SIGNIFICANT CHANGE UP (ref 8.4–10.5)
CHLORIDE SERPL-SCNC: 102 MMOL/L — SIGNIFICANT CHANGE UP (ref 96–108)
CO2 SERPL-SCNC: 26 MMOL/L — SIGNIFICANT CHANGE UP (ref 22–31)
CREAT SERPL-MCNC: 1.05 MG/DL — SIGNIFICANT CHANGE UP (ref 0.5–1.3)
GLUCOSE SERPL-MCNC: 97 MG/DL — SIGNIFICANT CHANGE UP (ref 70–99)
HCT VFR BLD CALC: 42.1 % — SIGNIFICANT CHANGE UP (ref 39–50)
HGB BLD-MCNC: 14.8 G/DL — SIGNIFICANT CHANGE UP (ref 13–17)
MAGNESIUM SERPL-MCNC: 2 MG/DL — SIGNIFICANT CHANGE UP (ref 1.6–2.6)
MCHC RBC-ENTMCNC: 32.2 PG — SIGNIFICANT CHANGE UP (ref 27–34)
MCHC RBC-ENTMCNC: 35.2 G/DL — SIGNIFICANT CHANGE UP (ref 32–36)
MCV RBC AUTO: 91.7 FL — SIGNIFICANT CHANGE UP (ref 80–100)
PLATELET # BLD AUTO: 137 K/UL — LOW (ref 150–400)
POTASSIUM SERPL-MCNC: 3.7 MMOL/L — SIGNIFICANT CHANGE UP (ref 3.5–5.3)
POTASSIUM SERPL-SCNC: 3.7 MMOL/L — SIGNIFICANT CHANGE UP (ref 3.5–5.3)
RBC # BLD: 4.59 M/UL — SIGNIFICANT CHANGE UP (ref 4.2–5.8)
RBC # FLD: 12.4 % — SIGNIFICANT CHANGE UP (ref 10.3–16.9)
SODIUM SERPL-SCNC: 141 MMOL/L — SIGNIFICANT CHANGE UP (ref 135–145)
WBC # BLD: 5.4 K/UL — SIGNIFICANT CHANGE UP (ref 3.8–10.5)
WBC # FLD AUTO: 5.4 K/UL — SIGNIFICANT CHANGE UP (ref 3.8–10.5)

## 2018-11-03 PROCEDURE — 99231 SBSQ HOSP IP/OBS SF/LOW 25: CPT | Mod: 25

## 2018-11-03 RX ORDER — AMLODIPINE BESYLATE 2.5 MG/1
1 TABLET ORAL
Qty: 0 | Refills: 0 | COMMUNITY

## 2018-11-03 RX ORDER — AMLODIPINE BESYLATE 2.5 MG/1
1 TABLET ORAL
Qty: 30 | Refills: 3 | OUTPATIENT
Start: 2018-11-03 | End: 2019-03-02

## 2018-11-03 RX ORDER — ASPIRIN/CALCIUM CARB/MAGNESIUM 324 MG
1 TABLET ORAL
Qty: 30 | Refills: 11 | OUTPATIENT
Start: 2018-11-03 | End: 2019-10-28

## 2018-11-03 RX ORDER — CARVEDILOL PHOSPHATE 80 MG/1
1 CAPSULE, EXTENDED RELEASE ORAL
Qty: 60 | Refills: 3 | OUTPATIENT
Start: 2018-11-03 | End: 2019-03-02

## 2018-11-03 RX ORDER — CLOPIDOGREL BISULFATE 75 MG/1
1 TABLET, FILM COATED ORAL
Qty: 30 | Refills: 11 | OUTPATIENT
Start: 2018-11-03 | End: 2019-10-28

## 2018-11-03 RX ORDER — CARVEDILOL PHOSPHATE 80 MG/1
1 CAPSULE, EXTENDED RELEASE ORAL
Qty: 0 | Refills: 0 | COMMUNITY

## 2018-11-03 RX ORDER — POTASSIUM CHLORIDE 20 MEQ
40 PACKET (EA) ORAL ONCE
Qty: 0 | Refills: 0 | Status: COMPLETED | OUTPATIENT
Start: 2018-11-03 | End: 2018-11-03

## 2018-11-03 RX ADMIN — AMLODIPINE BESYLATE 5 MILLIGRAM(S): 2.5 TABLET ORAL at 05:30

## 2018-11-03 RX ADMIN — ATORVASTATIN CALCIUM 80 MILLIGRAM(S): 80 TABLET, FILM COATED ORAL at 00:24

## 2018-11-03 RX ADMIN — CARVEDILOL PHOSPHATE 12.5 MILLIGRAM(S): 80 CAPSULE, EXTENDED RELEASE ORAL at 05:30

## 2018-11-03 RX ADMIN — Medication 81 MILLIGRAM(S): at 12:51

## 2018-11-03 RX ADMIN — Medication 30 MILLIEQUIVALENT(S): at 00:27

## 2018-11-03 RX ADMIN — TAMSULOSIN HYDROCHLORIDE 0.4 MILLIGRAM(S): 0.4 CAPSULE ORAL at 00:24

## 2018-11-03 RX ADMIN — CLOPIDOGREL BISULFATE 75 MILLIGRAM(S): 75 TABLET, FILM COATED ORAL at 12:51

## 2018-11-03 RX ADMIN — Medication 40 MILLIEQUIVALENT(S): at 08:13

## 2018-11-03 NOTE — PROGRESS NOTE ADULT - SUBJECTIVE AND OBJECTIVE BOX
INTERVENTIONAL CARDIOLOGY FOLLOW UP NOTE    -Patient seen and examined this am    -No events overnight    -No complaints this am    VASCULAR ACCESS EXAM:    FEMORAL:    2+ right/left femoral pulse    2+ DP/PT pulses.    -Right femoral Access site clean, non-tender, without ecchymosis or hematoma.    A/P: 53 y.o Male with FHx of heart disease, PMHx of HTN, hyperlipidemia, CVA in 2008 (residual dysarthria, RUE and RLE weakness, on Plavix), BPH, Known CAD s/p JAVIER OM2 @ [facility] on 09/21/18, with residual 40% ,LAD, 100% dLCx ISR fills via collaterals from right, 100% mRCA ISR-distal segment fills via right to right, left to right collaterals now s/p PCI of mid and proximal RCA  with JAVIER via right femoral approach with no evidence of vascular complications post procedure.    -continue with current medications including dual antiplatelet therapy    -discharge instructions as per protocol    -outpatient follow up with primary cardiologist

## 2018-11-03 NOTE — DISCHARGE NOTE ADULT - PLAN OF CARE
continue medications, follow up You underwent a cardiac angiogram and received two stents to the right coronary artery. PLEASE CONTINUE ASPIRIN 81MG DAILY AND PLAVIX 75MG DAILY. DO NOT STOP THESE MEDICATIONS FOR ANY REASON AS THEY ARE KEEPING YOUR STENT OPEN AND PREVENTING A HEART ATTACK. Avoid strenuous activity or heavy lifting for the next five days. Do not take a bath or swim for the next five days; you may shower. For any bleeding or hematoma formation (hardened blood collection under the skin) at the access site of right groin please hold pressure and go to the emergency room. Please follow up with Dr. Broussard in 1-2 weeks. For recurrent chest pain, please call your doctor or go to the emergency room. continue medications Please continue medications as listed to keep your blood pressure controlled. For blood pressure that is too high or too low please see your doctor or go to the emergency room as necessary.

## 2018-11-03 NOTE — DISCHARGE NOTE ADULT - PATIENT PORTAL LINK FT
You can access the [facility] Patient Portal, offered by [facility], by registering with the following website: [email or web address]

## 2018-11-03 NOTE — DISCHARGE NOTE ADULT - CARE PROVIDER_API CALL
Rodrigo Broussard), Cardiovascular Disease; Interventional Cardiology; Nuclear Cardiology  [address] [place] [address]  Phone: [phone]  Fax: [phone]

## 2018-11-03 NOTE — DISCHARGE NOTE ADULT - CARE PLAN
Principal Discharge DX:	Coronary artery disease  Goal:	continue medications, follow up  Assessment and plan of treatment:	You underwent a cardiac angiogram and received two stents to the right coronary artery. PLEASE CONTINUE ASPIRIN 81MG DAILY AND PLAVIX 75MG DAILY. DO NOT STOP THESE MEDICATIONS FOR ANY REASON AS THEY ARE KEEPING YOUR STENT OPEN AND PREVENTING A HEART ATTACK. Avoid strenuous activity or heavy lifting for the next five days. Do not take a bath or swim for the next five days; you may shower. For any bleeding or hematoma formation (hardened blood collection under the skin) at the access site of right groin please hold pressure and go to the emergency room. Please follow up with Dr. Broussard in 1-2 weeks. For recurrent chest pain, please call your doctor or go to the emergency room.  Secondary Diagnosis:	HTN (hypertension)  Goal:	continue medications  Assessment and plan of treatment:	Please continue medications as listed to keep your blood pressure controlled. For blood pressure that is too high or too low please see your doctor or go to the emergency room as necessary.  Secondary Diagnosis:	Hyperlipidemia  Goal:	continue medications  Assessment and plan of treatment:	Please continue medications as listed to keep your blood pressure controlled. For blood pressure that is too high or too low please see your doctor or go to the emergency room as necessary.

## 2018-11-03 NOTE — DISCHARGE NOTE ADULT - HOSPITAL COURSE
52yo M with FHx of heart disease, PMHx of HTN, hyperlipidemia, CVA in 2008 (residual dysarthria, RUE and RLE weakness, on Plavix), BPH, Known CAD s/p JAVIER OM2 @ [facility] on 09/21/18, with residual 40% ,LAD, 100% dLCx ISR fills via collaterals from right, 100% mRCA ISR-distal segment fills via right to right, left to right collaterals.  He returns today for stage PCI of known residual RCA lesion.  Since his procedure.  Pt reports doing well and reports compliance with his DAPT; ASA/Plavix.    He denies experiencing any CP, SOB, palpitations, dizziness, syncope, recent PND/orthopnea, or LE edema.  Pt initially presented to his cardiologist c/o  progressively worsening HIGGINS x few months. At the time he reported that he cannot walk >2 blocks at slow pace without becoming fatigued and winded. Echo 6/18/18 revealed normal LV systolic function, EF:60-65%. Mild MR, trace TR, trace SD.  Exercise Stress Test 6/25/18 showed 1 mm ST depressions in lead III and AVF.  Nuclear Stress Test on 8/14/18 which revealed medium inferior partially reversible defect, EF:53%. In light of patients risk factors,  Known CAD,  pt returned for stage PCI of known residual RCA lesion. Patient underwent cardiac cath on 11/2/18 and received JAVIER x 2 proxRCA  and midRCA, right groin perclosed failed and manual pressure applied. Procedure was complicated by v-fib with unresponsive patient for which 1 shock @ 200J was administered and patient reverted to NSR, HD stable. Patient was seen and examined this AM and was asymptomatic without complaints. VSS, labs and tele reviewed. Patient cleared for discharge as discussed with Dr. Stroud. Patient has been given appropriate discharge instructions including medication regimen, access site management and follow up. Medications have been e-prescribed to patient's preferred pharmacy.    Temp 98.3F, HR 60bpm, /71, RR 18, O2 sat 100% RA  Gen: NAD, A&O x 3  Cards: RRR, clear S1 and S2 without murmur  Pulm: CTA b/l without w/r/r  Abd: BS x 4, soft, NT  Right groin: no hematoma or ooze, femoral pulse 2+ without bruit, DP/PT 1+  Ext: No LE edema or ulcerations b/l

## 2018-11-03 NOTE — DISCHARGE NOTE ADULT - MEDICATION SUMMARY - MEDICATIONS TO TAKE
I will START or STAY ON the medications listed below when I get home from the hospital:    Aspirin Enteric Coated 81 mg oral delayed release tablet  -- 1 tab(s) by mouth once a day  -- Indication: For Coronary artery disease, keeps stent open    enalapril 20 mg oral tablet  -- 1 tab(s) by mouth once a day  -- Indication: For high blood pressure, protects heart    Flomax 0.4 mg oral capsule  -- 1 cap(s) by mouth once a day  -- Indication: For BPH    atorvastatin 80 mg oral tablet  -- 1 tab(s) by mouth once a day (at bedtime)  -- Indication: For high cholesterol    Plavix 75 mg oral tablet  -- 1 tab(s) by mouth once a day  -- Indication: For Coronary artery disease, keeps stent open    Coreg 12.5 mg oral tablet  -- 1 tab(s) by mouth 2 times a day  -- Indication: For high blood pressure, protects heart    Norvasc 5 mg oral tablet  -- 1 tab(s) by mouth once a day  -- Indication: For high blood pressure    hydroCHLOROthiazide 50 mg oral tablet  -- 1 tab(s) by mouth once a day  -- Indication: For high blood pressure    MiraLax oral powder for reconstitution  -- 17 gram(s) by mouth once a day  -- Indication: For as needed for constipation    Vitamin D3 50,000 intl units oral capsule  -- 1 cap(s) by mouth once a week  -- Indication: For supplement

## 2018-11-08 DIAGNOSIS — I25.10 ATHEROSCLEROTIC HEART DISEASE OF NATIVE CORONARY ARTERY WITHOUT ANGINA PECTORIS: ICD-10-CM

## 2018-11-08 DIAGNOSIS — I49.01 VENTRICULAR FIBRILLATION: ICD-10-CM

## 2018-11-08 DIAGNOSIS — I37.1 NONRHEUMATIC PULMONARY VALVE INSUFFICIENCY: ICD-10-CM

## 2018-11-08 DIAGNOSIS — N40.0 BENIGN PROSTATIC HYPERPLASIA WITHOUT LOWER URINARY TRACT SYMPTOMS: ICD-10-CM

## 2018-11-08 DIAGNOSIS — E78.5 HYPERLIPIDEMIA, UNSPECIFIED: ICD-10-CM

## 2018-11-08 DIAGNOSIS — I25.110 ATHEROSCLEROTIC HEART DISEASE OF NATIVE CORONARY ARTERY WITH UNSTABLE ANGINA PECTORIS: ICD-10-CM

## 2018-11-08 DIAGNOSIS — E78.00 PURE HYPERCHOLESTEROLEMIA, UNSPECIFIED: ICD-10-CM

## 2018-11-08 DIAGNOSIS — Z95.5 PRESENCE OF CORONARY ANGIOPLASTY IMPLANT AND GRAFT: ICD-10-CM

## 2018-11-08 DIAGNOSIS — Z79.82 LONG TERM (CURRENT) USE OF ASPIRIN: ICD-10-CM

## 2018-11-08 DIAGNOSIS — I69.322 DYSARTHRIA FOLLOWING CEREBRAL INFARCTION: ICD-10-CM

## 2018-11-08 DIAGNOSIS — I69.351 HEMIPLEGIA AND HEMIPARESIS FOLLOWING CEREBRAL INFARCTION AFFECTING RIGHT DOMINANT SIDE: ICD-10-CM

## 2018-11-08 DIAGNOSIS — I10 ESSENTIAL (PRIMARY) HYPERTENSION: ICD-10-CM

## 2018-11-08 DIAGNOSIS — K59.00 CONSTIPATION, UNSPECIFIED: ICD-10-CM

## 2018-11-08 DIAGNOSIS — I08.1 RHEUMATIC DISORDERS OF BOTH MITRAL AND TRICUSPID VALVES: ICD-10-CM

## 2018-11-08 DIAGNOSIS — Z79.01 LONG TERM (CURRENT) USE OF ANTICOAGULANTS: ICD-10-CM

## 2018-11-16 PROCEDURE — C1874: CPT

## 2018-11-16 PROCEDURE — 83036 HEMOGLOBIN GLYCOSYLATED A1C: CPT

## 2018-11-16 PROCEDURE — C1894: CPT

## 2018-11-16 PROCEDURE — 83735 ASSAY OF MAGNESIUM: CPT

## 2018-11-16 PROCEDURE — 82553 CREATINE MB FRACTION: CPT

## 2018-11-16 PROCEDURE — C1887: CPT

## 2018-11-16 PROCEDURE — 80048 BASIC METABOLIC PNL TOTAL CA: CPT

## 2018-11-16 PROCEDURE — C1760: CPT

## 2018-11-16 PROCEDURE — C1889: CPT

## 2018-11-16 PROCEDURE — 80053 COMPREHEN METABOLIC PANEL: CPT

## 2018-11-16 PROCEDURE — 85025 COMPLETE CBC W/AUTO DIFF WBC: CPT

## 2018-11-16 PROCEDURE — 85610 PROTHROMBIN TIME: CPT

## 2018-11-16 PROCEDURE — C1769: CPT

## 2018-11-16 PROCEDURE — 86140 C-REACTIVE PROTEIN: CPT

## 2018-11-16 PROCEDURE — 85027 COMPLETE CBC AUTOMATED: CPT

## 2018-11-16 PROCEDURE — 80061 LIPID PANEL: CPT

## 2018-11-16 PROCEDURE — 85730 THROMBOPLASTIN TIME PARTIAL: CPT

## 2018-11-16 PROCEDURE — 82550 ASSAY OF CK (CPK): CPT

## 2018-11-16 PROCEDURE — C1725: CPT

## 2020-08-12 NOTE — DISCHARGE NOTE ADULT - MEDICATION SUMMARY - MEDICATIONS TO STOP TAKING
I will STOP taking the medications listed below when I get home from the hospital:  None not sure/denies family  h/o issues with anesthesia.